# Patient Record
Sex: MALE | Race: WHITE | NOT HISPANIC OR LATINO | Employment: FULL TIME | URBAN - METROPOLITAN AREA
[De-identification: names, ages, dates, MRNs, and addresses within clinical notes are randomized per-mention and may not be internally consistent; named-entity substitution may affect disease eponyms.]

---

## 2023-05-12 ENCOUNTER — OFFICE VISIT (OUTPATIENT)
Dept: CARDIOLOGY | Facility: CLINIC | Age: 23
End: 2023-05-12
Payer: COMMERCIAL

## 2023-05-12 VITALS
DIASTOLIC BLOOD PRESSURE: 80 MMHG | OXYGEN SATURATION: 99 % | WEIGHT: 260.2 LBS | HEART RATE: 120 BPM | HEIGHT: 69 IN | SYSTOLIC BLOOD PRESSURE: 150 MMHG | BODY MASS INDEX: 38.54 KG/M2

## 2023-05-12 DIAGNOSIS — G47.33 OSA ON CPAP: ICD-10-CM

## 2023-05-12 DIAGNOSIS — R94.31 ABNORMAL EKG: Primary | ICD-10-CM

## 2023-05-12 DIAGNOSIS — Z99.89 OSA ON CPAP: ICD-10-CM

## 2023-05-12 DIAGNOSIS — F90.9 ATTENTION DEFICIT HYPERACTIVITY DISORDER (ADHD), UNSPECIFIED ADHD TYPE: ICD-10-CM

## 2023-05-12 DIAGNOSIS — Z78.9 FEMALE-TO-MALE TRANSGENDER PERSON: ICD-10-CM

## 2023-05-12 RX ORDER — ACETAMINOPHEN AND CODEINE PHOSPHATE 120; 12 MG/5ML; MG/5ML
0.35 SOLUTION ORAL DAILY
COMMUNITY
Start: 2023-03-30

## 2023-05-12 RX ORDER — PROPRANOLOL HYDROCHLORIDE 10 MG/1
10 TABLET ORAL 3 TIMES DAILY
COMMUNITY

## 2023-05-12 RX ORDER — ALBUTEROL SULFATE 90 UG/1
2 AEROSOL, METERED RESPIRATORY (INHALATION)
COMMUNITY
Start: 2023-02-21 | End: 2024-02-21

## 2023-05-12 RX ORDER — FINASTERIDE 1 MG/1
1 TABLET, FILM COATED ORAL DAILY
Qty: 30 TABLET | Refills: 2 | COMMUNITY
Start: 2023-04-07 | End: 2023-07-06

## 2023-05-12 RX ORDER — TESTOSTERONE CYPIONATE 200 MG/ML
0.5 INJECTION, SOLUTION INTRAMUSCULAR
COMMUNITY
Start: 2020-11-18

## 2023-05-12 NOTE — PROGRESS NOTES
Subjective:     Encounter Date:05/12/2023      Patient ID: Kofi Early is a 22 y.o. male.    Chief Complaint:  History of Present Illness    This is a 22-year-old female to male transgender with obstructive sleep apnea on CPAP, anxiety, ADHD, who presents for an evaluation of an abnormal EKG.    The patient indicates that he was diagnosed with ADHD by his psychiatrist recently.  They are planning on starting him on a stimulant.  They recommended getting a baseline EKG before starting the therapy.  He had this performed at [facility] back in March.  He was told it was an abnormal EKG and that he should undergo cardiac evaluation before proceeding with stimulant therapy.    The patient indicates that he was recently diagnosed with obstructive sleep apnea and started using his CPAP about a week ago.  He still getting used to it but so far it appears to be going well.  He does notice an improvement in his ability to sleep throughout the night and feeling better rested in the morning.  He denies any chest pain, near-syncope or syncope, dizziness lightheadedness, lower extremity swelling.  He has noted some intermittent issues with breathing but attributes this to smoking marijuana.  He admits when he took a break from smoking marijuana for about a month that he noted improvement in his breathing issues.      He does not believe that his blood pressures are normally elevated.  He does know that when he goes to her doctor's office his blood pressures are usually elevated.  He does not check his blood pressures on a regular basis.  He also believes his heart rates are usually normal and suspects his heart rates are elevated in the office today because of anxiety.    He is not aware of any family history of heart disease.    Review of Systems   Constitutional: Positive for malaise/fatigue.   HENT: Negative for hearing loss, hoarse voice, nosebleeds and sore throat.    Eyes: Negative for  pain.   Cardiovascular: Positive for dyspnea on exertion. Negative for chest pain, claudication, cyanosis, irregular heartbeat, leg swelling, near-syncope, orthopnea, palpitations, paroxysmal nocturnal dyspnea and syncope.   Respiratory: Negative for shortness of breath and snoring.    Endocrine: Negative for cold intolerance, heat intolerance, polydipsia, polyphagia and polyuria.   Skin: Negative for itching and rash.   Musculoskeletal: Negative for arthritis, falls, joint pain, joint swelling, muscle cramps, muscle weakness and myalgias.   Gastrointestinal: Negative for constipation, diarrhea, dysphagia, heartburn, hematemesis, hematochezia, melena, nausea and vomiting.   Genitourinary: Negative for frequency, hematuria and hesitancy.   Neurological: Negative for excessive daytime sleepiness, dizziness, headaches, light-headedness, numbness and weakness.   Psychiatric/Behavioral: Negative for depression. The patient is not nervous/anxious.          Current Outpatient Medications:   •  albuterol sulfate  (90 Base) MCG/ACT inhaler, Inhale 2 puffs., Disp: , Rfl:   •  ascorbic acid (VITAMIN C) 100 MG tablet, , Disp: , Rfl:   •  Cariprazine HCl (VRAYLAR) 3 MG capsule capsule, , Disp: , Rfl:   •  finasteride (PROPECIA) 1 MG tablet, Take 1 tablet by mouth Daily., Disp: 30 tablet, Rfl: 2  •  norethindrone (MICRONOR) 0.35 MG tablet, Take 1 tablet by mouth Daily., Disp: , Rfl:   •  propranolol (INDERAL) 10 MG tablet, Take 1 tablet by mouth 3 (Three) Times a Day. As NEEDED, Disp: , Rfl:   •  Testosterone Cypionate (DEPOTESTOTERONE CYPIONATE) 200 MG/ML injection, Inject 0.5 mL under the skin into the appropriate area as directed., Disp: , Rfl:   •  vitamin D3 125 MCG (5000 UT) capsule capsule, , Disp: , Rfl:     Past Medical History:   Diagnosis Date   • Abnormal EKG 5/12/2023   • Sleep apnea March 22       History reviewed. No pertinent surgical history.    History reviewed. No pertinent family history.    Social  "History     Tobacco Use   • Smoking status: Former     Years: 4.00     Types: Cigarettes, Electronic Cigarette   • Smokeless tobacco: Never   • Tobacco comments:     Former use. Quitting on and off for 4 years   Vaping Use   • Vaping Use: Some days   Substance Use Topics   • Alcohol use: Not Currently   • Drug use: Yes     Types: Marijuana         ECG 12 Lead    Date/Time: 5/12/2023 3:14 PM  Performed by: Karine Mg MD  Authorized by: Karine Mg MD   Comparison: not compared with previous ECG   Previous ECG: no previous ECG available  Rhythm: sinus tachycardia  T inversion: III                 Objective:     Visit Vitals  /80 (BP Location: Right arm, Patient Position: Sitting, Cuff Size: Adult)   Pulse 120   Ht 175.3 cm (69\")   Wt 118 kg (260 lb 3.2 oz)   SpO2 99%   BMI 38.42 kg/m²         Constitutional:       Appearance: Normal appearance. Well-developed.   HENT:      Head: Normocephalic and atraumatic.   Neck:      Vascular: No carotid bruit or JVD.   Pulmonary:      Effort: Pulmonary effort is normal.      Breath sounds: Normal breath sounds.   Cardiovascular:      Normal rate. Regular rhythm.      No gallop.   Pulses:     Radial: 2+ bilaterally.  Edema:     Peripheral edema absent.   Abdominal:      Palpations: Abdomen is soft.   Skin:     General: Skin is warm and dry.   Neurological:      Mental Status: Alert and oriented to person, place, and time.             Assessment:          Diagnosis Plan   1. Abnormal EKG        2. JONA on CPAP        3. Attention deficit hyperactivity disorder (ADHD), unspecified ADHD type        4. Female-to-male transgender person               Plan:       1.  Reportedly abnormal EKG.  Unfortunately do not have the tracing from March to review.  We will try to obtain this from [facility].  His EKG in the office today shows sinus tachycardia with heart rate of 102 but otherwise is unremarkable.  2.  Obstructive sleep apnea.  Now on CPAP.  3.  Elevated blood pressures.  " Mildly elevated the office today but likely due to anxiety or whitecoat hypertension.  Asked the patient to monitor his blood pressures intermittently on his own.  I suspect with treatment of his sleep apnea blood pressures will also improve.  3.  ADHD  4.  Anxiety    We will review his prior EKG.  If this looks okay I think he can proceed with therapy for his ADHD as planned.     ADDENDUM (5/16/2023):  Received a copy of his prior EKG tracings from [facility].  I see no significant change compared to the EKG performed in our office.  I do not see any findings that warrant further work-up especially without any symptoms.  He is okay to proceed with therapy for his ADHD from my standpoint.  We will plan on seeing the patient back again as needed.  Would be happy to see him again if any further cardiac issues or concerns arise.